# Patient Record
Sex: FEMALE | Race: WHITE | NOT HISPANIC OR LATINO | URBAN - METROPOLITAN AREA
[De-identification: names, ages, dates, MRNs, and addresses within clinical notes are randomized per-mention and may not be internally consistent; named-entity substitution may affect disease eponyms.]

---

## 2018-12-28 ENCOUNTER — EMERGENCY (EMERGENCY)
Facility: HOSPITAL | Age: 28
LOS: 1 days | Discharge: HOME | End: 2018-12-28
Attending: EMERGENCY MEDICINE | Admitting: EMERGENCY MEDICINE
Payer: SELF-PAY

## 2018-12-28 VITALS
RESPIRATION RATE: 20 BRPM | SYSTOLIC BLOOD PRESSURE: 129 MMHG | OXYGEN SATURATION: 98 % | TEMPERATURE: 98 F | DIASTOLIC BLOOD PRESSURE: 69 MMHG | HEART RATE: 72 BPM | HEIGHT: 67 IN | WEIGHT: 164.91 LBS

## 2018-12-28 VITALS
OXYGEN SATURATION: 100 % | RESPIRATION RATE: 16 BRPM | HEART RATE: 55 BPM | DIASTOLIC BLOOD PRESSURE: 72 MMHG | TEMPERATURE: 98 F | SYSTOLIC BLOOD PRESSURE: 114 MMHG

## 2018-12-28 DIAGNOSIS — J03.90 ACUTE TONSILLITIS, UNSPECIFIED: ICD-10-CM

## 2018-12-28 LAB
ALBUMIN SERPL ELPH-MCNC: 4 G/DL — SIGNIFICANT CHANGE UP (ref 3.3–5)
ALP SERPL-CCNC: 77 U/L — SIGNIFICANT CHANGE UP (ref 40–120)
ALT FLD-CCNC: 10 U/L — SIGNIFICANT CHANGE UP (ref 10–45)
ANION GAP SERPL CALC-SCNC: 13 MMOL/L — SIGNIFICANT CHANGE UP (ref 5–17)
AST SERPL-CCNC: 10 U/L — SIGNIFICANT CHANGE UP (ref 10–40)
BASOPHILS # BLD AUTO: 0.1 K/UL — SIGNIFICANT CHANGE UP (ref 0–0.2)
BASOPHILS NFR BLD AUTO: 0.5 % — SIGNIFICANT CHANGE UP (ref 0–2)
BILIRUB SERPL-MCNC: 0.2 MG/DL — SIGNIFICANT CHANGE UP (ref 0.2–1.2)
BUN SERPL-MCNC: 6 MG/DL — LOW (ref 7–23)
CALCIUM SERPL-MCNC: 9.2 MG/DL — SIGNIFICANT CHANGE UP (ref 8.4–10.5)
CHLORIDE SERPL-SCNC: 102 MMOL/L — SIGNIFICANT CHANGE UP (ref 96–108)
CO2 SERPL-SCNC: 26 MMOL/L — SIGNIFICANT CHANGE UP (ref 22–31)
CREAT SERPL-MCNC: 0.7 MG/DL — SIGNIFICANT CHANGE UP (ref 0.5–1.3)
EOSINOPHIL # BLD AUTO: 0.3 K/UL — SIGNIFICANT CHANGE UP (ref 0–0.5)
EOSINOPHIL NFR BLD AUTO: 2.2 % — SIGNIFICANT CHANGE UP (ref 0–6)
GAS PNL BLDV: SIGNIFICANT CHANGE UP
GLUCOSE SERPL-MCNC: 86 MG/DL — SIGNIFICANT CHANGE UP (ref 70–99)
HCT VFR BLD CALC: 40.2 % — SIGNIFICANT CHANGE UP (ref 34.5–45)
HGB BLD-MCNC: 13.1 G/DL — SIGNIFICANT CHANGE UP (ref 11.5–15.5)
LYMPHOCYTES # BLD AUTO: 18 % — SIGNIFICANT CHANGE UP (ref 13–44)
LYMPHOCYTES # BLD AUTO: 2.2 K/UL — SIGNIFICANT CHANGE UP (ref 1–3.3)
MCHC RBC-ENTMCNC: 29.7 PG — SIGNIFICANT CHANGE UP (ref 27–34)
MCHC RBC-ENTMCNC: 32.6 GM/DL — SIGNIFICANT CHANGE UP (ref 32–36)
MCV RBC AUTO: 91.2 FL — SIGNIFICANT CHANGE UP (ref 80–100)
MONOCYTES # BLD AUTO: 1.3 K/UL — HIGH (ref 0–0.9)
MONOCYTES NFR BLD AUTO: 10.5 % — SIGNIFICANT CHANGE UP (ref 2–14)
NEUTROPHILS # BLD AUTO: 8.5 K/UL — HIGH (ref 1.8–7.4)
NEUTROPHILS NFR BLD AUTO: 68.8 % — SIGNIFICANT CHANGE UP (ref 43–77)
PLATELET # BLD AUTO: 312 K/UL — SIGNIFICANT CHANGE UP (ref 150–400)
POTASSIUM SERPL-MCNC: 4.6 MMOL/L — SIGNIFICANT CHANGE UP (ref 3.5–5.3)
POTASSIUM SERPL-SCNC: 4.6 MMOL/L — SIGNIFICANT CHANGE UP (ref 3.5–5.3)
PROT SERPL-MCNC: 7.2 G/DL — SIGNIFICANT CHANGE UP (ref 6–8.3)
RBC # BLD: 4.41 M/UL — SIGNIFICANT CHANGE UP (ref 3.8–5.2)
RBC # FLD: 11.6 % — SIGNIFICANT CHANGE UP (ref 10.3–14.5)
S PYO AG SPEC QL IA: NEGATIVE — SIGNIFICANT CHANGE UP
SODIUM SERPL-SCNC: 141 MMOL/L — SIGNIFICANT CHANGE UP (ref 135–145)
WBC # BLD: 12.3 K/UL — HIGH (ref 3.8–10.5)
WBC # FLD AUTO: 12.3 K/UL — HIGH (ref 3.8–10.5)

## 2018-12-28 PROCEDURE — 82803 BLOOD GASES ANY COMBINATION: CPT

## 2018-12-28 PROCEDURE — 82565 ASSAY OF CREATININE: CPT

## 2018-12-28 PROCEDURE — 82435 ASSAY OF BLOOD CHLORIDE: CPT

## 2018-12-28 PROCEDURE — 82947 ASSAY GLUCOSE BLOOD QUANT: CPT

## 2018-12-28 PROCEDURE — 99284 EMERGENCY DEPT VISIT MOD MDM: CPT | Mod: 25

## 2018-12-28 PROCEDURE — 84132 ASSAY OF SERUM POTASSIUM: CPT

## 2018-12-28 PROCEDURE — 85014 HEMATOCRIT: CPT

## 2018-12-28 PROCEDURE — 86308 HETEROPHILE ANTIBODY SCREEN: CPT

## 2018-12-28 PROCEDURE — 85027 COMPLETE CBC AUTOMATED: CPT

## 2018-12-28 PROCEDURE — 96374 THER/PROPH/DIAG INJ IV PUSH: CPT | Mod: XU

## 2018-12-28 PROCEDURE — 84295 ASSAY OF SERUM SODIUM: CPT

## 2018-12-28 PROCEDURE — 96375 TX/PRO/DX INJ NEW DRUG ADDON: CPT | Mod: XU

## 2018-12-28 PROCEDURE — 87880 STREP A ASSAY W/OPTIC: CPT

## 2018-12-28 PROCEDURE — 83605 ASSAY OF LACTIC ACID: CPT

## 2018-12-28 PROCEDURE — 99284 EMERGENCY DEPT VISIT MOD MDM: CPT

## 2018-12-28 PROCEDURE — 82330 ASSAY OF CALCIUM: CPT

## 2018-12-28 PROCEDURE — 70491 CT SOFT TISSUE NECK W/DYE: CPT

## 2018-12-28 PROCEDURE — 42700 I&D ABSCESS PERITONSILLAR: CPT

## 2018-12-28 PROCEDURE — 70491 CT SOFT TISSUE NECK W/DYE: CPT | Mod: 26

## 2018-12-28 PROCEDURE — 31575 DIAGNOSTIC LARYNGOSCOPY: CPT

## 2018-12-28 PROCEDURE — 80053 COMPREHEN METABOLIC PANEL: CPT

## 2018-12-28 RX ORDER — CHLORHEXIDINE GLUCONATE 213 G/1000ML
15 SOLUTION TOPICAL
Qty: 1 | Refills: 0 | OUTPATIENT
Start: 2018-12-28

## 2018-12-28 RX ORDER — DEXAMETHASONE 0.5 MG/5ML
10 ELIXIR ORAL ONCE
Qty: 0 | Refills: 0 | Status: DISCONTINUED | OUTPATIENT
Start: 2018-12-28 | End: 2018-12-28

## 2018-12-28 RX ORDER — DEXAMETHASONE 0.5 MG/5ML
10 ELIXIR ORAL ONCE
Qty: 0 | Refills: 0 | Status: COMPLETED | OUTPATIENT
Start: 2018-12-28 | End: 2018-12-28

## 2018-12-28 RX ORDER — KETOROLAC TROMETHAMINE 30 MG/ML
15 SYRINGE (ML) INJECTION ONCE
Qty: 0 | Refills: 0 | Status: DISCONTINUED | OUTPATIENT
Start: 2018-12-28 | End: 2018-12-28

## 2018-12-28 RX ADMIN — Medication 10 MILLIGRAM(S): at 14:52

## 2018-12-28 RX ADMIN — Medication 15 MILLIGRAM(S): at 14:52

## 2018-12-28 RX ADMIN — Medication 100 MILLIGRAM(S): at 16:36

## 2018-12-28 NOTE — ED PROVIDER NOTE - OBJECTIVE STATEMENT
27 yo f no pmh pw sore throat. pt states she has been treated with azithromycin, pcn, and steroids for last two weeks. pt reported initial improvement in sx. today she states she awoke with recurrent pain, and voice hoarseness, and decreased po intake. pain is 10/10, localized, worse with speaking, better with rest. reports utd on vax. denies sob, cp, f/c, n/v.

## 2018-12-28 NOTE — ED PROVIDER NOTE - PHYSICAL EXAMINATION
General: well appearing, nad, speaking full sentences, no drooling, muffled voice  HEENT: EOMI, PERRLA, normal mucosa, no lesions on the lips or on oral mucosa, normal external ear, b/l enlarged tonsils with no evidence of exudate  Neck: supple,  full range of motion, no nuchal rigidity  CV: RRR, normal S1 and S2 with no murmur, capillary refill less than two seconds  Resp: lungs CTA b/l, good aeration bilaterally, symmetric chest wall   Abd: non-distended, soft, non-tender, normoactive bowel sounds  : no CVA tenderness  MSK: full range of motion, no cyanosis, no edema, no clubbing, no immobility  Neuro: muscle strength 5/5 in all extremities, normal gait  Skin: no rashes, skin intact

## 2018-12-28 NOTE — ED ADULT TRIAGE NOTE - CHIEF COMPLAINT QUOTE
2.5 weeks sore throat treated with ABX, prednisone, z-pack without resolve; radiates to L jaw. +fevers/chills; recent diagnosis of coxsackie; at 10AM took 600mg Advil

## 2018-12-28 NOTE — CONSULT NOTE ADULT - SUBJECTIVE AND OBJECTIVE BOX
CC: tonsillitis/ upper airway eval    HPI:  29 yo F presents with 2. 5 weeks of worsening sore throat. she was seen as outpatient and started empirically on PCN, completed 7 day course and then placed on z pack. she completed a medrol dose pack 2 days ago. she states that her symptoms improved while she was on the steroids, but then worsened over the past 1 day. + muffled voice, dysphagia, odynophagia and occasionally spitting saliva, however trying to stay hydrated. Pain is moderate constant dull and reports fevers over the past few days. Pt denies any sob, dyspnea,  no uri symptoms, no cough, no chest pain/sob. no vomiting.     PAST MEDICAL & SURGICAL HISTORY:  No pertinent past medical history  No significant past surgical history    Allergies    No Known Allergies    Intolerances      MEDICATIONS  (STANDING):    MEDICATIONS  (PRN):      Social History: no tobacco, no etoh     Family history: Pt denies any sign FHx    ROS:   ENT: all negative except as noted in HPI   CV: denies palpitations  Pulm: denies SOB, cough, hemoptysis  GI: denies change in apetite, indigestion, n/v  : denies pertinent urinary symptoms, urgency  Neuro: denies numbness/tingling, loss of sensation  Psych: denies anxiety  MS: denies muscle weakness, instability  Heme: denies easy bruising or bleeding  Endo: denies heat/cold intolerance, excessive sweating  Vascular: denies LE edema    Vital Signs Last 24 Hrs  T(C): 36.7 (28 Dec 2018 14:28), Max: 36.7 (28 Dec 2018 12:44)  T(F): 98.1 (28 Dec 2018 14:28), Max: 98.1 (28 Dec 2018 12:44)  HR: 75 (28 Dec 2018 14:28) (72 - 75)  BP: 115/79 (28 Dec 2018 14:28) (115/79 - 129/69)  BP(mean): --  RR: 17 (28 Dec 2018 14:28) (17 - 20)  SpO2: 100% (28 Dec 2018 14:28) (98% - 100%)                          13.1   12.3  )-----------( 312      ( 28 Dec 2018 14:47 )             40.2             PHYSICAL EXAM:  Gen: NAD + mufffled voice   Skin: No rashes, bruises, or lesions  Head: Normocephalic, Atraumatic  Face: no edema, erythema, or fluctuance. Parotid glands soft without mass  Eyes: no scleral injection  Nose: Nares bilaterally patent, no discharge  Mouth: + 3+ tonsils L>R with exudates and erythema. no fluctuance or signs of PTA.  No Stridor / Drooling / Trismus.  Mucosa moist, tongue/uvula midline  Neck: b/l cervical lymphoadenopathy, supple trachea midline, no masses  Resp: breathing easily, no stridor  CV: no peripheral edema/cyanosis  GI: nondistended   Peripheral vascular: no JVD or edema  Neuro: facial nerve intact, no facial droop        Fiberoptic Indirect laryngoscopy:  (Scope #2 used) Reason for Laryngoscopy:    Patient was unable to cooperate with mirror. tonsillar edema noted L>R   Nasopharynx, oropharynx, and hypopharynx clear, no bleeding. Tongue base, posterior pharyngeal wall, vallecula, epiglottis, and subglottis appear normal. No erythema, edema, pooling of secretions, masses or lesions. Airway patent, no foreign body visualized. No glottic/supraglottic edema. True vocal cords, arytenoids, vestibular folds, ventricles, pyriform sinuses, and aryepiglottic folds appear normal bilaterally. Vocal cords mobile with good contact b/l.            IMAGING/ADDITIONAL STUDIES: CC: tonsillitis/ upper airway eval    HPI:  27 yo F presents with 2. 5 weeks of worsening sore throat. she was seen as outpatient and started empirically on PCN, completed 7 day course and then placed on z pack. she completed a medrol dose pack 2 days ago. she states that her symptoms improved while she was on the steroids, but then worsened over the past 1 day. + muffled voice, dysphagia, odynophagia and occasionally spitting saliva, however trying to stay hydrated. Pain is moderate constant dull and reports fevers over the past few days. Pt denies any sob, dyspnea,  no uri symptoms, no cough, no chest pain/sob. no vomiting.     PAST MEDICAL & SURGICAL HISTORY:  No pertinent past medical history  No significant past surgical history    Allergies    No Known Allergies    Intolerances      MEDICATIONS  (STANDING):    MEDICATIONS  (PRN):      Social History: no tobacco, no etoh     Family history: Pt denies any sign FHx    ROS:   ENT: all negative except as noted in HPI   CV: denies palpitations  Pulm: denies SOB, cough, hemoptysis  GI: denies change in apetite, indigestion, n/v  : denies pertinent urinary symptoms, urgency  Neuro: denies numbness/tingling, loss of sensation  Psych: denies anxiety  MS: denies muscle weakness, instability  Heme: denies easy bruising or bleeding  Endo: denies heat/cold intolerance, excessive sweating  Vascular: denies LE edema    Vital Signs Last 24 Hrs  T(C): 36.7 (28 Dec 2018 14:28), Max: 36.7 (28 Dec 2018 12:44)  T(F): 98.1 (28 Dec 2018 14:28), Max: 98.1 (28 Dec 2018 12:44)  HR: 75 (28 Dec 2018 14:28) (72 - 75)  BP: 115/79 (28 Dec 2018 14:28) (115/79 - 129/69)  BP(mean): --  RR: 17 (28 Dec 2018 14:28) (17 - 20)  SpO2: 100% (28 Dec 2018 14:28) (98% - 100%)                          13.1   12.3  )-----------( 312      ( 28 Dec 2018 14:47 )             40.2             PHYSICAL EXAM:  Gen: NAD + mufffled voice   Skin: No rashes, bruises, or lesions  Head: Normocephalic, Atraumatic  Face: no edema, erythema, or fluctuance. Parotid glands soft without mass  Eyes: no scleral injection  Nose: Nares bilaterally patent, no discharge  Mouth: + 3+ tonsils L>R with exudates and erythema. no fluctuance or signs of PTA.  No Stridor / Drooling / Trismus.  Mucosa moist, tongue/uvula midline  Neck: b/l cervical lymphoadenopathy, supple trachea midline, no masses  Resp: breathing easily, no stridor  CV: no peripheral edema/cyanosis  GI: nondistended   Peripheral vascular: no JVD or edema  Neuro: facial nerve intact, no facial droop        Fiberoptic Indirect laryngoscopy:  (Scope #2 used) Reason for Laryngoscopy:    Patient was unable to cooperate with mirror. tonsillar edema noted L>R   Nasopharynx, oropharynx, and hypopharynx clear, no bleeding. Tongue base, posterior pharyngeal wall, vallecula, epiglottis, and subglottis appear normal. No erythema, edema, pooling of secretions, masses or lesions. Airway patent, no foreign body visualized. No glottic/supraglottic edema. True vocal cords, arytenoids, vestibular folds, ventricles, pyriform sinuses, and aryepiglottic folds appear normal bilaterally. Vocal cords mobile with good contact b/l.            IMAGING/ADDITIONAL STUDIES: < from: CT Neck Soft Tissue w/ IV Cont (12.28.18 @ 15:23) >    IMPRESSION: Left peritonsillar organizing abscess measuring 2.4 x 2.6 x   2.7 cm( [id number]). Reactive adenopathy in the neck left greaterthan   right level 1B, level 2 as well as level 5    < end of copied text >      Procedure Note: I&D of PTA    Cetacaine sprayed onto left peritonsillar area. 3cc of 2% lidocaine with epinephrine injected into peritonsillar area. 11blade used to make lacunar incision and hemostat used to explore wound and break loculations until no more pus was expressed.  Patient tolerated procedure well with immediate relief of symptoms. culture obtained

## 2018-12-28 NOTE — ED ADULT NURSE NOTE - NSIMPLEMENTINTERV_GEN_ALL_ED
Implemented All Universal Safety Interventions:  [place] to call system. Call bell, personal items and telephone within reach. Instruct patient to call for assistance. Room bathroom lighting operational. Non-slip footwear when patient is off stretcher. Physically safe environment: no spills, clutter or unnecessary equipment. Stretcher in lowest position, wheels locked, appropriate side rails in place.

## 2018-12-28 NOTE — ED PROVIDER NOTE - ATTENDING CONTRIBUTION TO CARE
29 yo F presents with 2. 5 weeks of worsening sore throat. she was seen as outpatient and started empirically on PCN, completed 7 day course and then placed on z pack. she completed a medrol dose pack 2 days ago. she states that her symptoms improved while she was on the steroids, but then worsened over the past 1 day. + muffled voice. painful swallowing. moderate constant dull pain.   reports fevers over the past few days.   no uri symptoms, no cough, no chest pain/sob. no vomiting.   PE nontoxic. + muffled voice. no drooling. + bl tender cervical adenopathy. no tongue elevation. no submandivular swelling. + large tonsils bl, no exudates. no PTA visualized.   ent consulted --> scope with airway patent. 27 yo F presents with 2. 5 weeks of worsening sore throat. she was seen as outpatient and started empirically on PCN, completed 7 day course and then placed on z pack. she completed a medrol dose pack 2 days ago. she states that her symptoms improved while she was on the steroids, but then worsened over the past 1 day. + muffled voice. painful swallowing. moderate constant dull pain.   reports fevers over the past few days.   no uri symptoms, no cough, no chest pain/sob. no vomiting.   PE nontoxic. + muffled voice. no drooling. + bl tender cervical adenopathy. no tongue elevation. no submandibular swelling. + large tonsils bl, no exudates. no PTA visualized.   patient given clinda IV, decadron, and pain control  ent consulted --> scope with airway patent.   labs nonactionable. CT neck ordered.   patient signed out to Dr. arellano at this time. ct results, ent re-consult, pt re-eval and final dispo pending at time of signout

## 2018-12-28 NOTE — ED ADULT NURSE REASSESSMENT NOTE - NS ED NURSE REASSESS COMMENT FT1
Patient states she is feeling better, able to talk, not drooling, managing own secretions. States her baseline heart rate is 45-55.

## 2018-12-28 NOTE — ED PROVIDER NOTE - NSFOLLOWUPINSTRUCTIONS_ED_ALL_ED_FT
1) Please follow up with your Primary Care Provider in 24-48 hours  2) Seek immediate medical care for any new or returning symptoms including but not limited to difficulty swallowing, drooling, difficulty breathing, high fevers  3) Take Clindamycin 300 mg four times a day for 10 days. A side effect of this drug is loose stools, you may take Saccharomyces Boulardii to offset it  4) Take 15 mL of Chlorhexidine after meals, swish and spit  5) Take Tylenol 650 mg every 4-6 hours as needed for pain. Do not take more than 2 grams within a 24 hour period

## 2018-12-28 NOTE — ED PROVIDER NOTE - PROGRESS NOTE DETAILS
ENT contacted and informed of urgent scope consult due to concern of airway compromise.  - Lane Marks D.O. PGY1 Patient reassessed, NAD, non-toxic appearing. pt well appearing, tolerating secretions, voice no longer muffled after pta drainage. pt feels well to go home. will dc with strict return precautions.  - Lane Marks D.O. PGY1

## 2018-12-28 NOTE — ED ADULT NURSE NOTE - OBJECTIVE STATEMENT
29 yo female A&OX3 presents to the ED with the c/o sore throat x 2.5 weeks. Pt states that she was seen by urgent care 3x's in the past 2 weeks. As per patient she has been on a course of Amoxicillin as well as prednisone, was initially told that she had strep throat and was treated without being tested. Upon second visit to urgent care patient was tested for strep throat and told that she does not have it but has Coxsackie. Pt currently c/o severe sore throat, denies fevers and chills, no n/v. Pt reported congestion and runny nose last week but is now resolved. Pt throat appears inflamed,+ swollen tonsils and increase secretions, no purulent drainage. Pt c/o diff breathing when she lays flat. Lungs clear, equal, b/l no sob and no cough. MAEX4, mother at bedside.

## 2018-12-28 NOTE — CONSULT NOTE ADULT - ASSESSMENT
28y with tonsillitis r/o mono. Airway patent, no laryngeal edema noted 28y with tonsillitis with left PTA. Airway patent, no laryngeal edema noted. CT noted an abscess forming in left pta, drained with immediate relief by pt.

## 2018-12-28 NOTE — ED PROVIDER NOTE - NS ED ROS FT
GENERAL: No fever or chills, //             EYES: no change in vision, //             HEENT: dysphagia, dysphonia //             CARDIAC: no chest pain, //              PULMONARY: no cough or SOB, //             GI: no abdominal pain, no nausea or no vomiting, no diarrhea or constipation, //             : No changes in urination,  //            SKIN: no rashes,  //            NEURO: no headache,  //             MSK: No joint pain otherwise as HPI or negative. ~Lane Marks DO PGY1

## 2018-12-28 NOTE — CONSULT NOTE ADULT - PROBLEM SELECTOR RECOMMENDATION 9
- mono test   - if positive no abx, stay hydrated   - if neg, change to clinda   - decadron   - CDU if pt unable to tolerate po   - no further ent intervention at this time   - Patient should follow up in ENT office as an outpatient. May see Dr. Garcia or J[place]. Call [phone]. - PTA drained, f/u culture results   - change abx to clinda   - decadron, pain meds per ed   - CDU if pt unable to tolerate po   - no further ENT intervention at this time   - Patient should follow up in ENT office as an outpatient. May see Dr. Garcia or [place]. Call [phone].

## 2018-12-28 NOTE — ED ADULT NURSE NOTE - CHPI ED NUR SYMPTOMS NEG
Cardiac no fever/no weakness/no bleeding gums/no chills/no vomiting/no loss of consciousness/no nausea/no numbness/no syncope

## 2018-12-29 LAB — HETEROPH AB TITR SER AGGL: NEGATIVE — SIGNIFICANT CHANGE UP
